# Patient Record
Sex: MALE | Race: WHITE
[De-identification: names, ages, dates, MRNs, and addresses within clinical notes are randomized per-mention and may not be internally consistent; named-entity substitution may affect disease eponyms.]

---

## 2018-07-25 ENCOUNTER — HOSPITAL ENCOUNTER (EMERGENCY)
Dept: HOSPITAL 89 - ER | Age: 23
Discharge: HOME | End: 2018-07-25
Payer: SELF-PAY

## 2018-07-25 VITALS — SYSTOLIC BLOOD PRESSURE: 166 MMHG | DIASTOLIC BLOOD PRESSURE: 77 MMHG

## 2018-07-25 DIAGNOSIS — F10.920: Primary | ICD-10-CM

## 2018-07-25 PROCEDURE — 80320 DRUG SCREEN QUANTALCOHOLS: CPT

## 2018-07-25 PROCEDURE — 84295 ASSAY OF SERUM SODIUM: CPT

## 2018-07-25 PROCEDURE — 80357 KETAMINE AND NORKETAMINE: CPT

## 2018-07-25 PROCEDURE — 84460 ALANINE AMINO (ALT) (SGPT): CPT

## 2018-07-25 PROCEDURE — 82040 ASSAY OF SERUM ALBUMIN: CPT

## 2018-07-25 PROCEDURE — 99283 EMERGENCY DEPT VISIT LOW MDM: CPT

## 2018-07-25 PROCEDURE — 82374 ASSAY BLOOD CARBON DIOXIDE: CPT

## 2018-07-25 PROCEDURE — 84132 ASSAY OF SERUM POTASSIUM: CPT

## 2018-07-25 PROCEDURE — 82310 ASSAY OF CALCIUM: CPT

## 2018-07-25 PROCEDURE — 84450 TRANSFERASE (AST) (SGOT): CPT

## 2018-07-25 PROCEDURE — 82435 ASSAY OF BLOOD CHLORIDE: CPT

## 2018-07-25 PROCEDURE — 81001 URINALYSIS AUTO W/SCOPE: CPT

## 2018-07-25 PROCEDURE — 84520 ASSAY OF UREA NITROGEN: CPT

## 2018-07-25 PROCEDURE — 84075 ASSAY ALKALINE PHOSPHATASE: CPT

## 2018-07-25 PROCEDURE — 36415 COLL VENOUS BLD VENIPUNCTURE: CPT

## 2018-07-25 PROCEDURE — 82247 BILIRUBIN TOTAL: CPT

## 2018-07-25 PROCEDURE — 82565 ASSAY OF CREATININE: CPT

## 2018-07-25 PROCEDURE — 82947 ASSAY GLUCOSE BLOOD QUANT: CPT

## 2018-07-25 PROCEDURE — 84155 ASSAY OF PROTEIN SERUM: CPT

## 2018-07-25 PROCEDURE — 80305 DRUG TEST PRSMV DIR OPT OBS: CPT
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
